# Patient Record
Sex: MALE | Race: WHITE
[De-identification: names, ages, dates, MRNs, and addresses within clinical notes are randomized per-mention and may not be internally consistent; named-entity substitution may affect disease eponyms.]

---

## 2017-01-08 ENCOUNTER — HOSPITAL ENCOUNTER (EMERGENCY)
Dept: HOSPITAL 80 - FED | Age: 22
LOS: 1 days | Discharge: HOME | End: 2017-01-09
Payer: COMMERCIAL

## 2017-01-08 VITALS — RESPIRATION RATE: 16 BRPM
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
## 2017-01-08 DIAGNOSIS — F17.200: ICD-10-CM

## 2017-01-08 DIAGNOSIS — S33.5XXA: Primary | ICD-10-CM
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
## 2017-01-08 LAB
COLOR UR: YELLOW
NITRITE UR QL STRIP: NEGATIVE
PH UR STRIP: 8 [PH] (ref 5–7.5)
SP GR UR STRIP: 1.01 (ref 1–1.03)
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
## 2017-01-09 VITALS
SYSTOLIC BLOOD PRESSURE: 119 MMHG | TEMPERATURE: 97.9 F | HEART RATE: 57 BPM | DIASTOLIC BLOOD PRESSURE: 74 MMHG | OXYGEN SATURATION: 95 %

## 2017-03-08 ENCOUNTER — HOSPITAL ENCOUNTER (OUTPATIENT)
Dept: HOSPITAL 80 - FIMAGING | Age: 22
End: 2017-03-08
Attending: GENERAL ACUTE CARE HOSPITAL
Payer: MEDICAID

## 2017-03-08 DIAGNOSIS — M25.511: Primary | ICD-10-CM

## 2019-01-14 ENCOUNTER — HOSPITAL ENCOUNTER (EMERGENCY)
Dept: HOSPITAL 80 - FED | Age: 24
Discharge: HOME | End: 2019-01-14
Payer: MEDICAID

## 2019-01-14 VITALS — DIASTOLIC BLOOD PRESSURE: 60 MMHG | SYSTOLIC BLOOD PRESSURE: 125 MMHG

## 2019-01-14 DIAGNOSIS — F20.89: ICD-10-CM

## 2019-01-14 DIAGNOSIS — F43.10: ICD-10-CM

## 2019-01-14 DIAGNOSIS — J45.909: Primary | ICD-10-CM

## 2019-01-14 LAB — PLATELET # BLD: 245 10^3/UL (ref 150–400)

## 2019-01-14 NOTE — EDPHY
H & P


Stated Complaint: muscle wasting and night sweats concerned he might have TB


Source: Patient


Exam Limitations: No limitations





- Personal History


Current Tetanus Diphtheria and Acellular Pertussis (TDAP): Unsure





- Medical/Surgical History


Hx Asthma: Yes


Hx Chronic Respiratory Disease: No


Hx Diabetes: No


Hx Cardiac Disease: No


Hx Renal Disease: No


Hx Cirrhosis: No


Hx Alcoholism: No


Hx HIV/AIDS: No


Hx Splenectomy or Spleen Trauma: No


Other PMH: PTSD, schizophrenia, appendectomy





- Social History


Smoking Status: Former smoker


Time Seen by Provider: 01/14/19 18:35


HPI/ROS: 


HPI:  This is a 23-year-old male who presents with





Chief Complaint: muscle wasting and night sweats concerned he might have TB





Location:  Body


Quality:  Night sweats, muscle wasting, cough


Duration:  Several years


Signs and Symptoms: no fever, no nausea, no vomiting, no diarrhea, no urinary 

symptoms, no chest pain, no shortness of breath, no wheezing, no sore throat, 

no neck stiffness, no joint pain, no swollen glands, no ear pain, no rash


Timing:  Daily


Severity:  Moderate


Context:  Patient has a history of posttraumatic stress disorder, schizophrenia 

presents with 1-2 years of symptoms that include intermittent dry cough, 9 and 

sweats and muscle wasting but no weight loss, fevers, nausea, vomiting, rigors, 

chills.  He reports that the symptoms occur every few months and last for a few 

days and then self-resolved.  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  Denies any homelessness, IV drug use, foreign travel.  

Patient does not have a primary care provider.  Reports that he is not 

concerned about STDs including HIV.  Patient used to smoke 1-2 packs per day 

more [place] but quit within the last 3 months.


Modifying Factors:  None





Comment: 








ROS:   A comprehensive 10 system review of systems is otherwise negative aside 

from elements mentioned in the history of present illness. 





MEDICAL/SURGICAL/SOCIAL HISTORY: 


Medical history:  PTSD, schizophrenia


Surgical history:  Appendectomy


Social history: Former smoker.  Family history noncontributory.








CONSTITUTIONAL:  Extremely well-appearing young adult white male, awake and 

alert, no obvious distress


HEENT: Atraumatic and normocephalic, PERRL, EOMI.  Nares patent; no rhinorrhea;

  no nasal mucosal edema. Tympanic membranes clear. Oropharynx clear, no 

exudate and moist pink mucosa.  Airway patent.  No lymphadenopathy.  No 

meningismus.


Cardiovascular: Normal S1/S2, regular rate, regular rhythm, without murmur rub 

or gallop.


PULMONARY/CHEST:  Symmetrical and nontender. Clear to auscultation bilaterally. 

Good air movement. No accessory muscle usage.


ABDOMEN:  Soft, nondistended, nontender, no rebound, no guarding, no peritoneal 

signs, no masses or organomegaly. No CVAT.


EXTREMITIES:  2/2 pulses, strength 5/5, no deformities, no clubbing, no 

cyanosis or edema.


NEUROLOGICAL: no focal neuro deficits.  GCS 15.


SKIN: Warm and dry, no erythema. no rash.  Good capillary refill. 


 


 (Tania Fuchs)


Constitutional: 





 Initial Vital Signs











Temperature (C)  36.5 C   01/14/19 17:50


 


Heart Rate  69   01/14/19 17:50


 


Respiratory Rate  18   01/14/19 17:50


 


Blood Pressure  127/64 H  01/14/19 17:50


 


O2 Sat (%)  98   01/14/19 17:50








 











O2 Delivery Mode               Room Air














Allergies/Adverse Reactions: 


 





Penicillins Allergy (Verified 01/14/19 17:48)


 








Home Medications: 














 Medication  Instructions  Recorded


 


Albuterol [Proventil Inhaler HFA 1 - 2 puffs IH Q4H PRN #1 mdi 01/14/19





(*)]  














Medical Decision Making





- Diagnostics


Imaging Results: 





 Imaging Impressions





Chest X-Ray  01/14/19 18:49


Impression: Peribronchial thickening, consistent with reactive airways' disease

, with no focal infiltrate identified.


 


If there is progression of the patient's hemoptysis, chest CT imaging could be 

considered.











ED Course/Re-evaluation: 


Vital signs reviewed upon arrival and stable.


Physical exam is completely benign including lung exam.


Chest x-ray my read shows no opacity, no effusion, no widened mediastinum, no 

cavitary lesions. + reactive airway disease


Labs reviewed.  No signs of leukocytosis/anemia/platelet dysfunction/JOS/

elevated LFTs/electrolyte imbalance. 


Patient was referred to the [facility] for PPD placement and HIV testing.








This patient was seen under the supervision of my secondary supervising 

physician.  I evaluated care for this patient independently.  Discussed this 

patient with Dr. Mccabe who did not see the patient.  


 (Tania Fuchs)





I did not see this patient while he was in the emergency department.  However 

his care was discussed with the PA while the patient was in the department.  I 

agree with treatment plan and management (Waldo Mccabe)


Differential Diagnosis: 


Differential diagnosis includes but is not limited to autoimmune disease, 

tuberculosis, anemia, bronchitis, viral syndrome.


 (Tania Fuchs)





- Data Points


Laboratory Results: 





 Laboratory Results





 01/14/19 19:05 





 01/14/19 19:05 





 











  01/14/19 01/14/19





  19:05 19:05


 


WBC    6.38 10^3/uL 10^3/uL





    (3.80-9.50) 


 


RBC    5.07 10^6/uL 10^6/uL





    (4.40-6.38) 


 


Hgb    15.6 g/dL g/dL





    (13.7-17.5) 


 


Hct    44.4 % %





    (40.0-51.0) 


 


MCV    87.6 fL fL





    (81.5-99.8) 


 


MCH    30.8 pg pg





    (27.9-34.1) 


 


MCHC    35.1 g/dL g/dL





    (32.4-36.7) 


 


RDW    12.5 % %





    (11.5-15.2) 


 


Plt Count    245 10^3/uL 10^3/uL





    (150-400) 


 


MPV    9.7 fL fL





    (8.7-11.7) 


 


Neut % (Auto)    51.1 % %





    (39.3-74.2) 


 


Lymph % (Auto)    39.7 % %





    (15.0-45.0) 


 


Mono % (Auto)    7.2 % %





    (4.5-13.0) 


 


Eos % (Auto)    1.6 % %





    (0.6-7.6) 


 


Baso % (Auto)    0.2 % L %





    (0.3-1.7) 


 


Nucleat RBC Rel Count    0.0 % %





    (0.0-0.2) 


 


Absolute Neuts (auto)    3.27 10^3/uL 10^3/uL





    (1.70-6.50) 


 


Absolute Lymphs (auto)    2.53 10^3/uL 10^3/uL





    (1.00-3.00) 


 


Absolute Monos (auto)    0.46 10^3/uL 10^3/uL





    (0.30-0.80) 


 


Absolute Eos (auto)    0.10 10^3/uL 10^3/uL





    (0.03-0.40) 


 


Absolute Basos (auto)    0.01 10^3/uL L 10^3/uL





    (0.02-0.10) 


 


Absolute Nucleated RBC    0.00 10^3/uL 10^3/uL





    (0-0.01) 


 


Immature Gran %    0.2 % %





    (0.0-1.1) 


 


Immature Gran #    0.01 10^3/uL 10^3/uL





    (0.00-0.10) 


 


Sodium  136 mEq/L mEq/L  





   (135-145)  


 


Potassium  4.4 mEq/L mEq/L  





   (3.5-5.2)  


 


Chloride  106 mEq/L mEq/L  





   ()  


 


Carbon Dioxide  24 mEq/l mEq/l  





   (22-31)  


 


Anion Gap  6 mEq/L mEq/L  





   (6-14)  


 


BUN  18 mg/dL mg/dL  





   (7-23)  


 


Creatinine  1.2 mg/dL mg/dL  





   (0.7-1.3)  


 


Estimated GFR  > 60   





   


 


Glucose  79 mg/dL mg/dL  





   ()  


 


Calcium  9.1 mg/dL mg/dL  





   (8.5-10.4)  


 


Total Bilirubin  0.5 mg/dL mg/dL  





   (0.1-1.4)  


 


AST  25 IU/L IU/L  





   (17-59)  


 


ALT  28 IU/L IU/L  





   (21-72)  


 


Alkaline Phosphatase  93 IU/L IU/L  





   ()  


 


Total Protein  6.8 g/dL g/dL  





   (6.3-8.2)  


 


Albumin  4.1 g/dL g/dL  





   (3.5-5.0)  














Departure





- Departure


Disposition: Home, Routine, Self-Care


Clinical Impression: 


RAD (reactive airway disease)


Qualifiers:


 Asthma severity: mild Asthma persistence: unspecified Qualified Code(s): 

J45.909 - Unspecified asthma, uncomplicated





Condition: Good


Instructions:  Reactive Airways Disease (ED)


Additional Instructions: 


Please refrain from smoking marijuana or tobacco products.


Chest x-ray today does not show any acute process but does show reactive airway 

disease changes.


Use albuterol 1-2 puffs every 4-6 hours as needed for shortness of breath/

wheezing.


Establish care at the [facility] in the next 7-10 days.


Referrals: 


[facility],. [Clinic] - As per Instructions


Prescriptions: 


Albuterol [Proventil Inhaler HFA (*)] 1 - 2 puffs IH Q4H PRN #1 mdi


 PRN Reason: Short Of Breath/Dyspnea